# Patient Record
Sex: FEMALE | Race: BLACK OR AFRICAN AMERICAN | NOT HISPANIC OR LATINO | URBAN - METROPOLITAN AREA
[De-identification: names, ages, dates, MRNs, and addresses within clinical notes are randomized per-mention and may not be internally consistent; named-entity substitution may affect disease eponyms.]

---

## 2020-08-12 ENCOUNTER — LAB OUTSIDE AN ENCOUNTER (OUTPATIENT)
Dept: URBAN - METROPOLITAN AREA CLINIC 70 | Facility: CLINIC | Age: 70
End: 2020-08-12

## 2020-08-12 ENCOUNTER — DASHBOARD ENCOUNTERS (OUTPATIENT)
Age: 70
End: 2020-08-12

## 2020-08-12 ENCOUNTER — OFFICE VISIT (OUTPATIENT)
Dept: URBAN - METROPOLITAN AREA CLINIC 70 | Facility: CLINIC | Age: 70
End: 2020-08-12
Payer: MEDICARE

## 2020-08-12 ENCOUNTER — WEB ENCOUNTER (OUTPATIENT)
Dept: URBAN - METROPOLITAN AREA CLINIC 70 | Facility: CLINIC | Age: 70
End: 2020-08-12

## 2020-08-12 ENCOUNTER — OFFICE VISIT (OUTPATIENT)
Dept: URBAN - METROPOLITAN AREA CLINIC 70 | Facility: CLINIC | Age: 70
End: 2020-08-12

## 2020-08-12 DIAGNOSIS — Z80.0 FAMILY HISTORY OF RECTAL CANCER: ICD-10-CM

## 2020-08-12 DIAGNOSIS — R19.5 POSITIVE COLORECTAL CANCER SCREENING USING COLOGUARD TEST: ICD-10-CM

## 2020-08-12 PROCEDURE — G9903 PT SCRN TBCO ID AS NON USER: HCPCS | Performed by: REGISTERED NURSE

## 2020-08-12 PROCEDURE — G8427 DOCREV CUR MEDS BY ELIG CLIN: HCPCS | Performed by: REGISTERED NURSE

## 2020-08-12 PROCEDURE — G8419 CALC BMI OUT NRM PARAM NOF/U: HCPCS | Performed by: REGISTERED NURSE

## 2020-08-12 PROCEDURE — 99202 OFFICE O/P NEW SF 15 MIN: CPT | Performed by: REGISTERED NURSE

## 2020-08-12 PROCEDURE — 3017F COLORECTAL CA SCREEN DOC REV: CPT | Performed by: REGISTERED NURSE

## 2020-08-12 RX ORDER — DICLOFENAC SODIUM 50 MG/1
TABLET, DELAYED RELEASE ORAL
Qty: 60 UNSPECIFIED | Status: ACTIVE | COMMUNITY

## 2020-08-12 RX ORDER — LOSARTAN POTASSIUM AND HYDROCHLOROTHIAZIDE 100; 12.5 MG/1; MG/1
TABLET, FILM COATED ORAL
Qty: 90 UNSPECIFIED | Status: ACTIVE | COMMUNITY

## 2020-08-12 RX ORDER — GLIPIZIDE 10 MG/1
TABLET, FILM COATED, EXTENDED RELEASE ORAL
Qty: 90 UNSPECIFIED | Status: ACTIVE | COMMUNITY

## 2020-08-12 RX ORDER — GABAPENTIN 100 MG/1
CAPSULE ORAL
Qty: 90 UNSPECIFIED | Status: ON HOLD | COMMUNITY

## 2020-08-12 RX ORDER — FLUCONAZOLE 150 MG/1
TABLET ORAL
Qty: 2 UNSPECIFIED | Status: ACTIVE | COMMUNITY

## 2020-08-12 RX ORDER — TRAMADOL HYDROCHLORIDE 50 MG/1
TABLET, COATED ORAL
Qty: 30 UNSPECIFIED | Status: ACTIVE | COMMUNITY

## 2020-08-12 RX ORDER — CANAGLIFLOZIN 300 MG/1
TABLET, FILM COATED ORAL
Qty: 30 UNSPECIFIED | Status: ACTIVE | COMMUNITY

## 2020-08-12 NOTE — HPI-TODAY'S VISIT:
Pt referred for positive ColoGuard test. Pt currently denies abdominal pain, diarrhea, constipation, rectal bleeding or weight loss. She had a negative colonoscopy 8 years ago.  She also has a family history of rectal cancer in her sister who was diagnosed at the age of 60.

## 2020-08-14 ENCOUNTER — OFFICE VISIT (OUTPATIENT)
Dept: URBAN - METROPOLITAN AREA CLINIC 70 | Facility: CLINIC | Age: 70
End: 2020-08-14

## 2020-09-03 ENCOUNTER — OFFICE VISIT (OUTPATIENT)
Dept: URBAN - METROPOLITAN AREA SURGERY CENTER 24 | Facility: SURGERY CENTER | Age: 70
End: 2020-09-03

## 2020-09-24 ENCOUNTER — OFFICE VISIT (OUTPATIENT)
Dept: URBAN - METROPOLITAN AREA SURGERY CENTER 24 | Facility: SURGERY CENTER | Age: 70
End: 2020-09-24

## 2020-10-12 ENCOUNTER — OFFICE VISIT (OUTPATIENT)
Dept: URBAN - METROPOLITAN AREA SURGERY CENTER 24 | Facility: SURGERY CENTER | Age: 70
End: 2020-10-12

## 2020-10-12 RX ORDER — GABAPENTIN 100 MG/1
CAPSULE ORAL
Qty: 90 UNSPECIFIED | Status: ON HOLD | COMMUNITY

## 2020-10-12 RX ORDER — DICLOFENAC SODIUM 50 MG/1
TABLET, DELAYED RELEASE ORAL
Qty: 60 UNSPECIFIED | Status: ACTIVE | COMMUNITY

## 2020-10-12 RX ORDER — LOSARTAN POTASSIUM AND HYDROCHLOROTHIAZIDE 100; 12.5 MG/1; MG/1
TABLET, FILM COATED ORAL
Qty: 90 UNSPECIFIED | Status: ACTIVE | COMMUNITY

## 2020-10-12 RX ORDER — FLUCONAZOLE 150 MG/1
TABLET ORAL
Qty: 2 UNSPECIFIED | Status: ACTIVE | COMMUNITY

## 2020-10-12 RX ORDER — TRAMADOL HYDROCHLORIDE 50 MG/1
TABLET, COATED ORAL
Qty: 30 UNSPECIFIED | Status: ACTIVE | COMMUNITY

## 2020-10-12 RX ORDER — GLIPIZIDE 10 MG/1
TABLET, FILM COATED, EXTENDED RELEASE ORAL
Qty: 90 UNSPECIFIED | Status: ACTIVE | COMMUNITY

## 2020-10-12 RX ORDER — CANAGLIFLOZIN 300 MG/1
TABLET, FILM COATED ORAL
Qty: 30 UNSPECIFIED | Status: ACTIVE | COMMUNITY

## 2021-01-04 ENCOUNTER — OFFICE VISIT (OUTPATIENT)
Dept: URBAN - METROPOLITAN AREA SURGERY CENTER 24 | Facility: SURGERY CENTER | Age: 71
End: 2021-01-04

## 2021-01-04 RX ORDER — FLUCONAZOLE 150 MG/1
TABLET ORAL
Qty: 2 UNSPECIFIED | Status: ACTIVE | COMMUNITY

## 2021-01-04 RX ORDER — LOSARTAN POTASSIUM AND HYDROCHLOROTHIAZIDE 100; 12.5 MG/1; MG/1
TABLET, FILM COATED ORAL
Qty: 90 UNSPECIFIED | Status: ACTIVE | COMMUNITY

## 2021-01-04 RX ORDER — GABAPENTIN 100 MG/1
CAPSULE ORAL
Qty: 90 UNSPECIFIED | Status: ON HOLD | COMMUNITY

## 2021-01-04 RX ORDER — GLIPIZIDE 10 MG/1
TABLET, FILM COATED, EXTENDED RELEASE ORAL
Qty: 90 UNSPECIFIED | Status: ACTIVE | COMMUNITY

## 2021-01-04 RX ORDER — TRAMADOL HYDROCHLORIDE 50 MG/1
TABLET, COATED ORAL
Qty: 30 UNSPECIFIED | Status: ACTIVE | COMMUNITY

## 2021-01-04 RX ORDER — CANAGLIFLOZIN 300 MG/1
TABLET, FILM COATED ORAL
Qty: 30 UNSPECIFIED | Status: ACTIVE | COMMUNITY

## 2021-01-04 RX ORDER — DICLOFENAC SODIUM 50 MG/1
TABLET, DELAYED RELEASE ORAL
Qty: 60 UNSPECIFIED | Status: ACTIVE | COMMUNITY

## 2021-02-08 ENCOUNTER — OFFICE VISIT (OUTPATIENT)
Dept: URBAN - METROPOLITAN AREA SURGERY CENTER 24 | Facility: SURGERY CENTER | Age: 71
End: 2021-02-08

## 2021-03-25 ENCOUNTER — OFFICE VISIT (OUTPATIENT)
Dept: URBAN - METROPOLITAN AREA SURGERY CENTER 24 | Facility: SURGERY CENTER | Age: 71
End: 2021-03-25